# Patient Record
Sex: FEMALE | Race: BLACK OR AFRICAN AMERICAN | Employment: OTHER
[De-identification: names, ages, dates, MRNs, and addresses within clinical notes are randomized per-mention and may not be internally consistent; named-entity substitution may affect disease eponyms.]

---

## 2023-11-27 ENCOUNTER — HOSPITAL ENCOUNTER (OUTPATIENT)
Facility: HOSPITAL | Age: 78
Setting detail: RECURRING SERIES
Discharge: HOME | End: 2023-11-30
Payer: MEDICARE

## 2023-11-27 PROCEDURE — 97161 PT EVAL LOW COMPLEX 20 MIN: CPT

## 2023-11-27 NOTE — PROGRESS NOTES
PT DAILY TREATMENT NOTE/SHOULDER EVAL 10-18      Patient Name: Lei Bradley    Date: 2023    : 1945  Insurance: Payor: Barbie Spivey / Plan: [employer] HMO / Product Type: *No Product type* /      Patient  verified yes     Visit #   Current / Total 1 16   Time   In / Out 1045 am  1125 am    Pain   In / Out 7 7     TREATMENT AREA =  Pain in right shoulder [M25.511]      SUBJECTIVE  Pain Level (0-10 scale): 7/10, burning and aching  [x]constant []intermittent []improving []worsening []no change since onset    Any medication changes, allergies to medications, adverse drug reactions, diagnosis change, or new procedure performed?: [x] No    [] Yes (see summary sheet for update)  Subjective functional status/changes:     PLOF: functionally independent, no AD, active lifestyle, walking for exercise  Limitations to PLOF: upper extremity dressing, putting seat belt on, reaching overhead  Mechanism of Injury: 65 yo female who presents to [facility] with c/o right shoulder pain. Patient states she got an MRI and the results showed a \"torn rotator cuff\" as well as some bicep \"tearing\". Patient reports the shoulder started hurting her in July with insidious onset. Patient has had a couple falls this year, but states that this did not cause the shoulder pain.    Current symptoms/Complaints: 3/10 at best with thermacare, heat, neurontin; 10/10 at worst with using right arm; pt reports they are unable to sleep through the night secondary to pain  Previous Treatment/Compliance: [facility]  PMHx/Surgical Hx: Hx of trigger finger in right hand, osteoporosis, heart disease, HTN (controlled with medication)   Imaging: MRI-torn rotator cuff  Work Hx: retired   Living Situation: living with roommate,   3 years ago  Pt Goals: \"I would like to reduce my pain\"  Barriers: []pain []financial []time []transportation []other  Motivation: good  Substance use: []Alcohol []Tobacco

## 2023-11-27 NOTE — PROGRESS NOTES
[facility] at the [address], [address] [address] [address]   Phone: [phone]     Fax: [phone]       Plan of Care/ Statement of Necessity for Physical Therapy Services    Patient name: Bee Piedra Start of Care: 2023   Referral source: Doc Helton : 1945    Medical Diagnosis: Pain in right shoulder [M25.511]       Onset Date:2023    Treatment Diagnosis: M25.511  RIGHT SHOULDER PAIN                            Prior Hospitalization: see medical history Provider#: [id number]   Medications: Verified on Patient Summary List     Comorbidities: Hx of trigger finger in right hand, osteoporosis, heart disease, HTN (controlled with medication)   Prior Level of Function: functionally independent, no AD, active lifestyle, walking for exercise      The Plan of Care and following information is based on the information from the initial evaluation. Assessment/ key information: 67 yo female who presents to [facility] with c/o right shoulder pain. Patient states she got an MRI and the results showed a \"torn rotator cuff\" as well as some bicep \"tearing\". Patient reports the shoulder started hurting her in July with insidious onset. Patient has had a couple falls this year, but states that this did not cause the shoulder pain. Patient demonstrates decreased ROM, decreased strength, impaired posture, pain and decreased functional mobility tolerance. These impairments causing patient difficulty with UE dressing, reaching overhead and putting seatbelt on.       Patient will continue to benefit from skilled PT services to modify and progress therapeutic interventions, address functional mobility deficits, address ROM deficits, address strength deficits, analyze and address soft tissue restrictions, analyze and cue movement patterns, analyze and modify body mechanics/ergonomics, and assess and modify postural abnormalities to attain

## 2023-12-07 ENCOUNTER — HOSPITAL ENCOUNTER (OUTPATIENT)
Facility: HOSPITAL | Age: 78
Setting detail: RECURRING SERIES
Discharge: HOME | End: 2023-12-10
Payer: MEDICARE

## 2023-12-07 PROCEDURE — 97110 THERAPEUTIC EXERCISES: CPT

## 2023-12-07 PROCEDURE — 97530 THERAPEUTIC ACTIVITIES: CPT

## 2023-12-07 NOTE — PROGRESS NOTES
Procedures: Tx Min Billable or 1:1 Min (if diff from Tx Min) Procedure, Rationale, Specifics   10  72852 Therapeutic Exercise (timed):  increase ROM, strength, coordination, balance, and proprioception to improve patient's ability to progress to PLOF and address remaining functional goals. (see flow sheet as applicable)    Details if applicable:       30  75573 Therapeutic Activity (timed):  use of dynamic activities replicating functional movements to increase ROM, strength, coordination, balance, and proprioception in order to improve patient's ability to progress to PLOF and address remaining functional goals. (see flow sheet as applicable)     Details if applicable:           Details if applicable:            Details if applicable:     36  [facility] Totals Reminder: bill using total billable min of TIMED therapeutic procedures (example: do not include dry needle or estim unattended, both untimed codes, in totals to left)  8-22 min = 1 unit; 23-37 min = 2 units; 38-52 min = 3 units; 53-67 min = 4 units; 68-82 min = 5 units   Total Total     TOTAL TREATMENT TIME:        40     [x]  Patient Education billed concurrently with other procedures   [x] Review HEP    [] Progressed/Changed HEP, detail:    [] Other detail:       Objective Information/Functional Measures/Assessment    Patient tolerated treatment well with minor c/o discomfort with table slides. Educated patient to not push ROM past the pain. Added peg board and flex bar activities. Patient reports soreness in the biceps with flexbar supination and pronation.  Wpuold benefit from continued skilled PT to address deficits below       Patient will continue to benefit from skilled PT / OT services to modify and progress therapeutic interventions, analyze and address functional mobility deficits, analyze and address ROM deficits, analyze and address strength deficits, analyze and address soft tissue restrictions, analyze and cue for proper movement patterns, and

## 2023-12-11 ENCOUNTER — HOSPITAL ENCOUNTER (OUTPATIENT)
Facility: HOSPITAL | Age: 78
Setting detail: RECURRING SERIES
Discharge: HOME | End: 2023-12-14
Payer: MEDICARE

## 2023-12-11 PROCEDURE — 97530 THERAPEUTIC ACTIVITIES: CPT

## 2023-12-11 PROCEDURE — 97140 MANUAL THERAPY 1/> REGIONS: CPT

## 2023-12-11 PROCEDURE — 97112 NEUROMUSCULAR REEDUCATION: CPT

## 2023-12-11 NOTE — PROGRESS NOTES
PHYSICAL / OCCUPATIONAL THERAPY - DAILY TREATMENT NOTE (updated )    Patient Name: Marco Antonio Barcenas    Date: 2023    : 1945  Insurance: Payor: Corrine Nguyen / Plan: [address] [place] [address] HMO / Product Type: *No Product type* /      Patient  verified Yes     Visit #   Current / Total 3 16   Time   In / Out 8:01 8:49   Pain   In / Out 6 0   Subjective Functional Status/Changes: Reports that she doesn't want to have surgery. Reports hasn't done HEP but has an exercise ball and pulley at home. Changes to: Allergies, Med Hx, Sx Hx?   no       TREATMENT AREA =  Pain in right shoulder [M25.511]    OBJECTIVE    Modalities Rationale:     decrease pain and increase tissue extensibility to improve patient's ability to progress to PLOF and address remaining functional goals. min [x] Estim Unattended, type/location:                                      []  w/ice    []  w/heat    min [] Estim Attended, type/location:                                     []  w/US     []  w/ice    []  w/heat    []  TENS insruct      min []  Mechanical Traction: type/lbs                   []  pro   []  sup   []  int   []  cont    []  before manual    []  after manual    min []  Ultrasound, settings/location:      min []  Iontophoresis w/ dexamethasone, location:                                               []  take home patch       []  in clinic     10   min  unbilled []  Ice     [x]  Heat    location/position: MH to right shoulder post therapy sitting in chair    min []  Paraffin,  details:     min []  Vasopneumatic Device, press/temp:     min []  Shelbyt Natan / Napoleon Rodriguez:     If using vaso (only need to measure limb vaso being performed on)      pre-treatment girth :       post-treatment girth :       measured at (landmark location) :      min []  Other:    Skin assessment post-treatment (if applicable):    []  intact    []  redness- no adverse reaction                 []redness - adverse reaction:         Therapeutic

## 2023-12-15 ENCOUNTER — APPOINTMENT (OUTPATIENT)
Facility: HOSPITAL | Age: 78
End: 2023-12-15
Payer: MEDICARE

## 2023-12-21 ENCOUNTER — HOSPITAL ENCOUNTER (OUTPATIENT)
Facility: HOSPITAL | Age: 78
Setting detail: RECURRING SERIES
Discharge: HOME | End: 2023-12-24
Payer: MEDICARE

## 2023-12-21 PROCEDURE — 97140 MANUAL THERAPY 1/> REGIONS: CPT

## 2023-12-21 PROCEDURE — 97110 THERAPEUTIC EXERCISES: CPT

## 2023-12-21 PROCEDURE — 97112 NEUROMUSCULAR REEDUCATION: CPT

## 2023-12-21 NOTE — PROGRESS NOTES
PHYSICAL / OCCUPATIONAL THERAPY - DAILY TREATMENT NOTE (updated )    Patient Name: Jus Singh    Date: 2023    : 1945  Insurance: Payor: Christina Hayes / Plan: [employer] PLUS HMO / Product Type: *No Product type* /      Patient  verified Yes     Visit #   Current / Total 5 16   Time   In / Out 1241 pm 135 pm   Pain   In / Out 6 5   Subjective Functional Status/Changes: Pt reports she cannot find a comfortable way to sleep. Changes to: Allergies, Med Hx, Sx Hx?   no       TREATMENT AREA =  Pain in right shoulder [M25.511]    OBJECTIVE    Modalities Rationale:     decrease pain and increase tissue extensibility to improve patient's ability to progress to PLOF and address remaining functional goals. min [] Estim Unattended, type/location:                                      []  w/ice    []  w/heat    min [] Estim Attended, type/location:                                     []  w/US     []  w/ice    []  w/heat    []  TENS insruct      min []  Mechanical Traction: type/lbs                   []  pro   []  sup   []  int   []  cont    []  before manual    []  after manual    min []  Ultrasound, settings/location:      min []  Iontophoresis w/ dexamethasone, location:                                               []  take home patch       []  in clinic     15   min  unbilled []  Ice     [x]  Heat    location/position: Seated right shoulder     min []  Paraffin,  details:     min []  Vasopneumatic Device, press/temp:     min []  [place] Reason / Valeria : If using vaso (only need to measure limb vaso being performed on)      pre-treatment girth :       post-treatment girth :       measured at (landmark location) :      min []  Other:    Skin assessment post-treatment (if applicable):    []  intact    []  redness- no adverse reaction                 []redness - adverse reaction:         Therapeutic Procedures:   Tx Min Billable or 1:1 Min (if diff from [employer]) Procedure, Rationale, Specifics   11

## 2023-12-26 ENCOUNTER — HOSPITAL ENCOUNTER (OUTPATIENT)
Facility: HOSPITAL | Age: 78
Setting detail: RECURRING SERIES
Discharge: HOME | End: 2023-12-29
Payer: MEDICARE

## 2023-12-26 PROCEDURE — 97530 THERAPEUTIC ACTIVITIES: CPT

## 2023-12-26 PROCEDURE — 97535 SELF CARE MNGMENT TRAINING: CPT

## 2023-12-26 PROCEDURE — 97110 THERAPEUTIC EXERCISES: CPT

## 2023-12-26 NOTE — PROGRESS NOTES
/  [facility] at the [place] [address] [address]  Phone: [phone]      Fax:  [phone]    Progress Note  Patient name: Mitra Garcia Start of Care: 2023   Referral source: Shelby Valdovinos : 1945               Medical Diagnosis: Pain in right shoulder [M25.511]        Onset Date:2023               Treatment Diagnosis: M25.511  RIGHT SHOULDER PAIN                            Prior Hospitalization: see medical history Provider#: [id number]   Medications: Verified on Patient Summary List      Comorbidities: Hx of trigger finger in right hand, osteoporosis, heart disease, HTN (controlled with medication)   Prior Level of Function: functionally independent, no AD, active lifestyle, walking for exercise                            Visits from Start of Care: 6    Missed Visits: 0    Updated Goals/Measure of Progress: To be achieved in 5 weeks:    Short Term Goals: To be accomplished in 3 weeks:  Patient will be independent and compliant with HEP to progress toward goals and restore functional mobility. Eval Status: issued at eval  PN:23 Reports performing 4x/week      Patient will improve pain in right shoulder to 7/10 at worst to improve UE dressing tolerance and restore prior level of function. Eval Status: 10/10 at worst  PN:23 Reports pain in the last 48 hours 4-10/10         Pt will have painfree right shoulder PROM WFL to aid in functional mechanics for ADLs.   Eval Status:               AROM                           PROM              Shoulder Left Right Left Right   Flexion 128 98 WFL all directions 95    74   Unable to assess d/t guarding   ER WFL 10   Unable to assess d/t guarding   IR WFL 60   Unable to assess d/t guarding      PN: 23     some progress/regression     AROM                                 PROM        Shoulder Left Right Left Right   Flexion 128 80 WFL all directions 95

## 2023-12-26 NOTE — PROGRESS NOTES
PHYSICAL / OCCUPATIONAL THERAPY - DAILY TREATMENT NOTE (updated )    Patient Name: Coral Han    Date: 2023    : 1945  Insurance: Payor: Michelle Saleem / Plan: Regina MCKEON HMO / Product Type: *No Product type* /      Patient  verified Yes     Visit #   Current / Total 6 16   Time   In / Out 1200 1250   Pain   In / Out 8 6   Subjective Functional Status/Changes: Reports stinging in the biceps. Increased pain this AM    Changes to: Allergies, Med Hx, Sx Hx?   no       TREATMENT AREA =  Pain in right shoulder [M25.511]    OBJECTIVE    Modalities Rationale:     decrease pain and increase tissue extensibility to improve patient's ability to progress to PLOF and address remaining functional goals. min [] Estim Unattended, type/location:                                                            []  w/ice    []  w/heat     min [] Estim Attended, type/location:                                                           []  w/US     []  w/ice    []  w/heat    []  TENS insruct       min []  Mechanical Traction: type/lbs                    []  pro   []  sup   []  int   []  cont    []  before manual    []  after manual     min []  Ultrasound, settings/location:        min []  Iontophoresis w/ dexamethasone, location:                                                []  take home patch       []  in clinic      10    min  unbilled []  Ice     [x]  Heat    location/position: Seated right shoulder      min []  Paraffin,  details:       min []  Vasopneumatic Device, press/temp:       min []  [place] / [place]: If using vaso (only need to measure limb vaso being performed on)      pre-treatment girth :       post-treatment girth :       measured at (landmark location) :       min []  Other:     Skin assessment post-treatment (if applicable):    []  intact    []  redness- no adverse reaction                 []redness - adverse reaction:         Therapeutic Procedures:   Tx Min Billable

## 2023-12-29 ENCOUNTER — HOSPITAL ENCOUNTER (OUTPATIENT)
Facility: HOSPITAL | Age: 78
Setting detail: RECURRING SERIES
Discharge: HOME | End: 2024-01-01
Payer: MEDICARE

## 2023-12-29 PROCEDURE — 97110 THERAPEUTIC EXERCISES: CPT

## 2023-12-29 PROCEDURE — 97530 THERAPEUTIC ACTIVITIES: CPT

## 2023-12-29 PROCEDURE — 97112 NEUROMUSCULAR REEDUCATION: CPT

## 2024-01-02 ENCOUNTER — HOSPITAL ENCOUNTER (OUTPATIENT)
Facility: HOSPITAL | Age: 79
Setting detail: RECURRING SERIES
Discharge: HOME | End: 2024-01-05
Payer: MEDICARE

## 2024-01-02 PROCEDURE — 97016 VASOPNEUMATIC DEVICE THERAPY: CPT

## 2024-01-02 PROCEDURE — 97110 THERAPEUTIC EXERCISES: CPT

## 2024-01-02 PROCEDURE — 97140 MANUAL THERAPY 1/> REGIONS: CPT

## 2024-01-02 PROCEDURE — 97112 NEUROMUSCULAR REEDUCATION: CPT

## 2024-01-02 NOTE — PROGRESS NOTES
Provider Department Center   1/2/2024 12:00 PM Verónica Briones, PT [facility]   1/4/2024 12:40 PM Kaur Shultz, PT [facility]   1/9/2024 12:00 PM Spencer Bush, PTA [facility]   1/11/2024 12:40 PM Kaur Shultz, PT [facility]   1/16/2024 12:00 PM Spencer Bush, PTA [facility]   1/18/2024 12:40 PM Verónica Briones, PT [facility]

## 2024-01-04 ENCOUNTER — HOSPITAL ENCOUNTER (OUTPATIENT)
Facility: HOSPITAL | Age: 79
Setting detail: RECURRING SERIES
Discharge: HOME | End: 2024-01-07
Payer: MEDICARE

## 2024-01-04 PROCEDURE — 97110 THERAPEUTIC EXERCISES: CPT

## 2024-01-04 PROCEDURE — 97140 MANUAL THERAPY 1/> REGIONS: CPT

## 2024-01-04 NOTE — PROGRESS NOTES
PHYSICAL / OCCUPATIONAL THERAPY - DAILY TREATMENT NOTE (updated )    Patient Name: Emiliana Kaur    Date: 2024    : 1945  Insurance: Payor: [facility] MEDICARE / Plan: DO [facility] MEDICARE / Product Type: *No Product type* /      Patient  verified Yes     Visit #   Current / Total 9 16   Time   In / Out 245 pm 327 pm   Pain   In / Out 5 5   Subjective Functional Status/Changes: Pt reports she is feeling better today.    Changes to:  Allergies, Med Hx, Sx Hx?   no       TREATMENT AREA =  Pain in right shoulder [M25.511]    OBJECTIVE    Modalities Rationale:     decrease pain and increase tissue extensibility to improve patient's ability to progress to PLOF and address remaining functional goals.     min [] Estim Unattended, type/location:                                      []  w/ice    []  w/heat    min [] Estim Attended, type/location:                                     []  w/US     []  w/ice    []  w/heat    []  TENS insruct      min []  Mechanical Traction: type/lbs                   []  pro   []  sup   []  int   []  cont    []  before manual    []  after manual    min []  Ultrasound, settings/location:      min []  Iontophoresis w/ dexamethasone, location:                                               []  take home patch       []  in clinic     5 prior   min  unbilled []  Ice     [x]  Heat    location/position: Seated right shoulder     min []  Paraffin,  details:    3 min [x]  Vasopneumatic Device, press/temp: 34 deg, mod    min []  [place]irl[employer] / Fluido:    If using vaso (only need to measure limb vaso being performed on)      pre-treatment girth : 41.9 cm      post-treatment girth :  not assessed, patient had to leave due to being late for another appointment       measured at (landmark location) :  mid axilla     min []  Other:    Skin assessment post-treatment (if applicable):    []  intact    []  redness- no adverse reaction                 []redness - adverse reaction:         Therapeutic

## 2024-01-09 ENCOUNTER — APPOINTMENT (OUTPATIENT)
Facility: HOSPITAL | Age: 79
End: 2024-01-09
Payer: MEDICARE

## 2024-01-10 NOTE — PROGRESS NOTES
PHYSICAL / OCCUPATIONAL THERAPY - DAILY TREATMENT NOTE     Patient Name: Emiliana Kaur    Date: 1/10/2024    : 1945  Insurance: Payor: [facility] MEDICARE / Plan: DO [facility] MEDICARE / Product Type: *No Product type* /      Patient  verified Yes     Visit #   Current / Total 10 16   Time   In / Out 1241 pm  139 pm   Pain   In / Out 5 4   Subjective Functional Status/Changes: Pt states she thinks she sleeps on the shoulder \"wrong\" some nights and wakes up in more pain some mornings.   Changes to:  Allergies, Med Hx, Sx Hx?   no       TREATMENT AREA =  Pain in right shoulder [M25.511]    OBJECTIVE    Modalities Rationale:     decrease pain and increase tissue extensibility to improve patient's ability to progress to PLOF and address remaining functional goals.     min [] Estim Unattended, type/location:                                      []  w/ice    []  w/heat    min [] Estim Attended, type/location:                                     []  w/US     []  w/ice    []  w/heat    []  TENS insruct      min []  Mechanical Traction: type/lbs                   []  pro   []  sup   []  int   []  cont    []  before manual    []  after manual    min []  Ultrasound, settings/location:      min []  Iontophoresis w/ dexamethasone, location:                                               []  take home patch       []  in clinic     5 min pre   min  unbilled []  Ice     [x]  Heat    location/position: Seated, right shoulder     min []  Paraffin,  details:    10 min [x]  Vasopneumatic Device, press/temp: 34 deg, med     min []  Whirlpool / Fluido:    If using vaso (only need to measure limb vaso being performed on)      pre-treatment girth : 40.5 cm       post-treatment girth :       measured at (landmark location) :  mid axilla     min []  Other:    Skin assessment post-treatment (if applicable):    []  intact    []  redness- no adverse reaction                 []redness - adverse reaction:         Therapeutic Procedures:  Tx

## 2024-01-11 ENCOUNTER — HOSPITAL ENCOUNTER (OUTPATIENT)
Facility: HOSPITAL | Age: 79
Setting detail: RECURRING SERIES
Discharge: HOME | End: 2024-01-14
Payer: MEDICARE

## 2024-01-11 PROCEDURE — 97016 VASOPNEUMATIC DEVICE THERAPY: CPT

## 2024-01-11 PROCEDURE — 97112 NEUROMUSCULAR REEDUCATION: CPT

## 2024-01-11 PROCEDURE — 97110 THERAPEUTIC EXERCISES: CPT

## 2024-01-11 PROCEDURE — 97140 MANUAL THERAPY 1/> REGIONS: CPT

## 2024-01-16 ENCOUNTER — HOSPITAL ENCOUNTER (OUTPATIENT)
Facility: HOSPITAL | Age: 79
Setting detail: RECURRING SERIES
Discharge: HOME | End: 2024-01-19
Payer: MEDICARE

## 2024-01-16 PROCEDURE — 97140 MANUAL THERAPY 1/> REGIONS: CPT

## 2024-01-16 PROCEDURE — 97016 VASOPNEUMATIC DEVICE THERAPY: CPT

## 2024-01-16 PROCEDURE — 97112 NEUROMUSCULAR REEDUCATION: CPT

## 2024-01-16 PROCEDURE — 97110 THERAPEUTIC EXERCISES: CPT

## 2024-01-16 NOTE — PROGRESS NOTES
PHYSICAL / OCCUPATIONAL THERAPY - DAILY TREATMENT NOTE     Patient Name: Emiliana Kaur    Date: 2024    : 1945  Insurance: Payor: [employer] MEDICARE / Plan: DO [facility] MEDICARE / Product Type: *No Product type* /      Patient  verified Yes     Visit #   Current / Total 11 16   Time   In / Out 11:45am 12:40pm   Pain   In / Out 7-8 7   Subjective Functional Status/Changes: Pt reports that her bicep is hurting badly today. Reports that she uses medicine and heat to manage pain at home.    Changes to:  Allergies, Med Hx, Sx Hx?   no       TREATMENT AREA =  Pain in right shoulder [M25.511]    OBJECTIVE    Modalities Rationale:     decrease pain and increase tissue extensibility to improve patient's ability to progress to PLOF and address remaining functional goals.     min [] Estim Unattended, type/location:                                      []  w/ice    []  w/heat    min [] Estim Attended, type/location:                                     []  w/US     []  w/ice    []  w/heat    []  TENS insruct      min []  Mechanical Traction: type/lbs                   []  pro   []  sup   []  int   []  cont    []  before manual    []  after manual    min []  Ultrasound, settings/location:      min []  Iontophoresis w/ dexamethasone, location:                                               []  take home patch       []  in clinic     5 min pre   min  unbilled []  Ice     [x]  Heat    location/position: Seated, right shoulder   (Performed concurrently during subjective intake + patient education)    min []  Paraffin,  details:    10 min [x]  Vasopneumatic Device, press/temp: 34 deg, med     min []  Whirlpool / Fluido:    If using vaso (only need to measure limb vaso being performed on)      pre-treatment girth : 40.5 cm       post-treatment girth :       measured at (landmark location) :  mid axilla     min []  Other:    Skin assessment post-treatment (if applicable):    []  intact    []  redness- no adverse reaction

## 2024-01-18 ENCOUNTER — HOSPITAL ENCOUNTER (OUTPATIENT)
Facility: HOSPITAL | Age: 79
Setting detail: RECURRING SERIES
Discharge: HOME | End: 2024-01-21
Payer: MEDICARE

## 2024-01-18 PROCEDURE — 97530 THERAPEUTIC ACTIVITIES: CPT

## 2024-01-18 PROCEDURE — 97112 NEUROMUSCULAR REEDUCATION: CPT

## 2024-01-18 PROCEDURE — 97110 THERAPEUTIC EXERCISES: CPT

## 2024-01-18 NOTE — PROGRESS NOTES
PHYSICAL / OCCUPATIONAL THERAPY - DAILY TREATMENT NOTE     Patient Name: Emiliana Kaur    Date: 2024    : 1945  Insurance: Payor: [employer] MEDICARE / Plan: DO [facility] MEDICARE / Product Type: *No Product type* /      Patient  verified Yes     Visit #   Current / Total 12 16   Time   In / Out 12:40pm 1:25pm   Pain   In / Out 6 5-6   Subjective Functional Status/Changes: The pt reports feeling fair    Changes to:  Allergies, Med Hx, Sx Hx?   no       TREATMENT AREA =  Pain in right shoulder [M25.511]    OBJECTIVE    Modalities Rationale:     decrease pain to improve patient's ability to progress to PLOF and address remaining functional goals.     min [] Estim Unattended, type/location:                                      []  w/ice    []  w/heat    min [] Estim Attended, type/location:                                     []  w/US     []  w/ice    []  w/heat    []  TENS insruct      min []  Mechanical Traction: type/lbs                   []  pro   []  sup   []  int   []  cont    []  before manual    []  after manual    min []  Ultrasound, settings/location:      min []  Iontophoresis w/ dexamethasone, location:                                               []  take home patch       []  in clinic     5   min  unbilled []  Ice     [x]  Heat    location/position: Seated - prior to exercise interventions - right shoulder    min []  Paraffin,  details:     min []  Vasopneumatic Device, press/temp:     min []  Whirlpool / Fluido:    If using vaso (only need to measure limb vaso being performed on)      pre-treatment girth :       post-treatment girth :       measured at (landmark location) :      min []  Other:    Skin assessment post-treatment (if applicable):    []  intact    []  redness- no adverse reaction                 []redness - adverse reaction:         Therapeutic Procedures:  Tx Min Billable or 1:1 Min (if diff from Tx Min) Procedure, Rationale, Specifics   18  05383 Therapeutic Exercise (timed):  
  Flexion 128 98 WFL all directions 95    74   Unable to assess d/t guarding   ER WFL 10   Unable to assess d/t guarding   IR WFL 60   Unable to assess d/t guarding      Last PN 12/26/23:     some progress/regression     AROM                                 PROM        Shoulder Left Right Left Right   Flexion 128 80 WFL all directions 95    85   109   ER WFL P 52   52   IR WFL P 52   52      Current 1/18/24: progressing                 AROM                                 PROM              Shoulder Left Right Left Right   Flexion NT 95    ABD NT 74    ER @90 deg ABD NT 90  NT 90   IR @90 deg ABD NT NT NT 43   *frequent VC for the pt to perform deep breathing to decrease muscle guarding for PROM      Pt will have 4/5 right shoulder strength to return to goals of reaching overhead without difficulty.  Eval Status:   Shoulder Left (1-5) Right (1-5)   Shoulder Flexion 4- 3   Shoulder ABD 4- 3-   Shoulder IR 4 4-   Shoulder ER 4 3+   Elbow flexion 4+ 4-   Elbow extension 4 4-   Mid trap  NT NT   Lower trap NT NT      Last PN 12/26/23:progressing   Shoulder Left (1-5) Right (1-5)   Shoulder Flexion 4- 3   Shoulder ABD 4- 3-   Shoulder IR 4- 4-   Shoulder ER 4- 4-   Elbow flexion 4 4-   Elbow extension 4 4-   Mid trap  NT NT   Lower trap NT NT      Current 1/18/24: change in status may be due to a different therapist      Shoulder Left (1-5) Right (1-5)   Shoulder Flexion 4- 2+   Shoulder ABD 4 2+   Shoulder IR 4 3+   Shoulder ER 4- 3+   Elbow flexion 4+ 4   Elbow extension 4 4+   Mid trap  NT NT   Lower trap NT NT            Patient will improve pain in right shoulder to 4/10 at worst to improve overhead reaching tolerance and restore prior level of function.  Eval Status: 10/10 at worst  Last PN 12/26/23: Reports pain in the last 48 hours 4-10/10   Current 1/18/24: see goal above     Frequency / Duration: Patient to be seen 2 times per week for 8weeks:    Assessment / Recommendations:77 yo

## 2024-01-24 ENCOUNTER — HOSPITAL ENCOUNTER (OUTPATIENT)
Facility: HOSPITAL | Age: 79
Setting detail: RECURRING SERIES
Discharge: HOME | End: 2024-01-27
Payer: MEDICARE

## 2024-01-24 PROCEDURE — 97110 THERAPEUTIC EXERCISES: CPT

## 2024-01-24 PROCEDURE — 97530 THERAPEUTIC ACTIVITIES: CPT

## 2024-01-24 PROCEDURE — 97112 NEUROMUSCULAR REEDUCATION: CPT

## 2024-01-24 PROCEDURE — 97016 VASOPNEUMATIC DEVICE THERAPY: CPT

## 2024-01-24 NOTE — PROGRESS NOTES
tolerated  []  Discharge due to :  []  Other:    Spencer Bush PTA    1/24/2024    7:58 AM    Future Appointments   Date Time Provider Department Center   1/24/2024 12:40 PM Spencer Bush, PTA [facility]   1/26/2024  9:30 AM Addie Hylton, PT [facility]   1/31/2024 12:40 PM Spencer Bush, PTA [facility]   2/2/2024 11:30 AM Addie Hylton, PT [facility]   2/7/2024 11:20 AM Verónica Briones, PT [facility]   2/9/2024 10:40 AM Verónica Briones, PT [facility]   2/14/2024 12:40 PM Verónica Briones, PT [facility]   2/16/2024 11:20 AM Verónica Briones, PT [facility]   2/21/2024 12:40 PM Spencer Bush, PTA [facility]   2/23/2024 11:20 AM Verónica Briones, PT [facility]   2/28/2024 11:20 AM Verónica Briones, PT [facility]   3/1/2024 11:20 AM Verónica Briones, PT [facility]   3/6/2024 12:00 PM Verónica Briones, PT [facility]   3/8/2024 11:20 AM Verónica Briones, PT [facility]

## 2024-01-26 ENCOUNTER — HOSPITAL ENCOUNTER (OUTPATIENT)
Facility: HOSPITAL | Age: 79
Setting detail: RECURRING SERIES
Discharge: HOME | End: 2024-01-29
Payer: MEDICARE

## 2024-01-26 PROCEDURE — 97110 THERAPEUTIC EXERCISES: CPT

## 2024-01-26 PROCEDURE — 97530 THERAPEUTIC ACTIVITIES: CPT

## 2024-01-26 PROCEDURE — 97016 VASOPNEUMATIC DEVICE THERAPY: CPT

## 2024-01-26 PROCEDURE — 97112 NEUROMUSCULAR REEDUCATION: CPT

## 2024-01-26 NOTE — PROGRESS NOTES
PHYSICAL / OCCUPATIONAL THERAPY - DAILY TREATMENT NOTE     Patient Name: Emiliana Kaur    Date: 2024    : 1945  Insurance: Payor: [facility] MEDICARE / Plan: DO [facility] MEDICARE / Product Type: *No Product type* /      Patient  verified Yes     Visit #   Current / Total 14 28   Time   In / Out 920 1015   Pain   In / Out 4 4   Subjective Functional Status/Changes: Reports decreased pain in the shoulder today compared to last visit    Changes to:  Allergies, Med Hx, Sx Hx?   no       TREATMENT AREA =  Pain in right shoulder [M25.511]    OBJECTIVE    Modalities Rationale:     decrease pain and increase tissue extensibility to improve patient's ability to progress to PLOF and address remaining functional goals.                             min [] Estim Unattended, type/location:                                                            []  w/ice    []  w/heat     min [] Estim Attended, type/location:                                                           []  w/US     []  w/ice    []  w/heat    []  TENS insruct       min []  Mechanical Traction: type/lbs                    []  pro   []  sup   []  int   []  cont    []  before manual    []  after manual     min []  Ultrasound, settings/location:        min []  Iontophoresis w/ dexamethasone, location:                                                []  take home patch       []  in clinic      5 min pre    min  unbilled []  Ice     [x]  Heat    location/position: Seated, right shoulder   (Performed concurrently during subjective intake + patient education)     min []  Paraffin,  details:     10 min [x]  Vasopneumatic Device, press/temp: 34 deg, med      min []  Whirlpool / Fluido:     If using vaso (only need to measure limb vaso being performed on)      pre-treatment girth : 40.5 cm       post-treatment girth : 40.5      measured at (landmark location) :  mid axilla      min []  Other:     Skin assessment post-treatment (if applicable):    []  intact    []

## 2024-01-31 ENCOUNTER — HOSPITAL ENCOUNTER (OUTPATIENT)
Facility: HOSPITAL | Age: 79
Setting detail: RECURRING SERIES
Discharge: HOME | End: 2024-02-03
Payer: MEDICARE

## 2024-01-31 PROCEDURE — 97112 NEUROMUSCULAR REEDUCATION: CPT

## 2024-01-31 PROCEDURE — 97110 THERAPEUTIC EXERCISES: CPT

## 2024-01-31 PROCEDURE — 97016 VASOPNEUMATIC DEVICE THERAPY: CPT

## 2024-01-31 PROCEDURE — 97530 THERAPEUTIC ACTIVITIES: CPT

## 2024-01-31 NOTE — PROGRESS NOTES
PHYSICAL / OCCUPATIONAL THERAPY - DAILY TREATMENT NOTE     Patient Name: Emiliana Kaur    Date: 2024    : 1945  Insurance: Payor: [employer] MEDICARE / Plan: DO [facility] MEDICARE / Product Type: *No Product type* /      Patient  verified Yes     Visit #   Current / Total 15 28   Time   In / Out 9:30am 10:25am   Pain   In / Out 5 4   Subjective Functional Status/Changes: Pt reports her shoulder is hurting more today. She attributes it to either the bad weather or doing too much laundry    Changes to:  Allergies, Med Hx, Sx Hx?   no       TREATMENT AREA =  Pain in right shoulder [M25.511]    OBJECTIVE    Modalities Rationale:     decrease pain and increase tissue extensibility to improve patient's ability to progress to PLOF and address remaining functional goals.                             min [] Estim Unattended, type/location:                                                            []  w/ice    []  w/heat     min [] Estim Attended, type/location:                                                           []  w/US     []  w/ice    []  w/heat    []  TENS insruct       min []  Mechanical Traction: type/lbs                    []  pro   []  sup   []  int   []  cont    []  before manual    []  after manual     min []  Ultrasound, settings/location:        min []  Iontophoresis w/ dexamethasone, location:                                                []  take home patch       []  in clinic      5 min pre    min  unbilled []  Ice     [x]  Heat    location/position: Seated, right shoulder   (Performed concurrently during subjective intake + patient education)     min []  Paraffin,  details:     10 min [x]  Vasopneumatic Device, press/temp: 34 deg, med      min []  Whirlpool / Fluido:     If using vaso (only need to measure limb vaso being performed on)      pre-treatment girth : 40.5 cm       post-treatment girth : 40.5      measured at (landmark location) :  mid axilla      min []  Other:     Skin

## 2024-02-02 ENCOUNTER — HOSPITAL ENCOUNTER (OUTPATIENT)
Facility: HOSPITAL | Age: 79
Setting detail: RECURRING SERIES
Discharge: HOME | End: 2024-02-05
Payer: MEDICARE

## 2024-02-02 PROCEDURE — 97140 MANUAL THERAPY 1/> REGIONS: CPT

## 2024-02-02 PROCEDURE — 97112 NEUROMUSCULAR REEDUCATION: CPT

## 2024-02-02 PROCEDURE — 97016 VASOPNEUMATIC DEVICE THERAPY: CPT

## 2024-02-02 PROCEDURE — 97110 THERAPEUTIC EXERCISES: CPT

## 2024-02-02 NOTE — PROGRESS NOTES
PHYSICAL / OCCUPATIONAL THERAPY - DAILY TREATMENT NOTE     Patient Name: Emiliana Kaur    Date: 2024    : 1945  Insurance: Payor: [employer] MEDICARE / Plan: DO [facility] MEDICARE / Product Type: *No Product type* /      Patient  verified Yes     Visit #   Current / Total 16 28   Time   In / Out 11:32am 12:30PM   Pain   In / Out 5-6 0   Subjective Functional Status/Changes: Pt reports that her bicep is feeling sore and like its going to spasm. She is unsure why.   Reports dec pain with turning nightstand lamp on/off using ergonomics discussed last session   Changes to:  Allergies, Med Hx, Sx Hx?   no       TREATMENT AREA =  Pain in right shoulder [M25.511]    OBJECTIVE    Modalities Rationale:     decrease pain and increase tissue extensibility to improve patient's ability to progress to PLOF and address remaining functional goals.                             min [] Estim Unattended, type/location:                                                            []  w/ice    []  w/heat     min [] Estim Attended, type/location:                                                           []  w/US     []  w/ice    []  w/heat    []  TENS insruct       min []  Mechanical Traction: type/lbs                    []  pro   []  sup   []  int   []  cont    []  before manual    []  after manual     min []  Ultrasound, settings/location:        min []  Iontophoresis w/ dexamethasone, location:                                                []  take home patch       []  in clinic      5 min pre    min  unbilled []  Ice     [x]  Heat    location/position: Seated, right shoulder   (Performed concurrently during subjective intake + patient education)     min []  Paraffin,  details:     10 min [x]  Vasopneumatic Device, press/temp: 34 deg, med      min []  Whirlpool / Fluido:     If using vaso (only need to measure limb vaso being performed on)      pre-treatment girth : 40.5 cm       post-treatment girth : 40.5      measured at

## 2024-02-07 ENCOUNTER — HOSPITAL ENCOUNTER (OUTPATIENT)
Facility: HOSPITAL | Age: 79
Setting detail: RECURRING SERIES
Discharge: HOME | End: 2024-02-10
Payer: MEDICARE

## 2024-02-07 PROCEDURE — 97530 THERAPEUTIC ACTIVITIES: CPT

## 2024-02-07 PROCEDURE — 97110 THERAPEUTIC EXERCISES: CPT

## 2024-02-07 PROCEDURE — 97016 VASOPNEUMATIC DEVICE THERAPY: CPT

## 2024-02-07 NOTE — PROGRESS NOTES
extension 4 4-   Mid trap  NT NT   Lower trap NT NT      Last PN 1/18/24: change in status may be due to a different therapist      Shoulder Left (1-5) Right (1-5)   Shoulder Flexion 4- 2+   Shoulder ABD 4 2+   Shoulder IR 4 3+   Shoulder ER 4- 3+   Elbow flexion 4+ 4   Elbow extension 4 4+   Mid trap  NT NT   Lower trap NT NT            Patient will improve pain in right shoulder to 4/10 at worst to improve overhead reaching tolerance and restore prior level of function.  Eval Status: 10/10 at worst  Last PN 1/18/24: see goal above   Current 2/7/2024: Progressing - Reports 4/10 pain entering today        PLAN  Yes  Continue plan of care  []  Upgrade activities as tolerated  []  Discharge due to :  []  Other:    Spencer Bush PTA    2/7/2024    7:42 AM    Future Appointments   Date Time Provider Department Center   2/7/2024 11:20 AM Spencer Bush PTA [facility]   2/9/2024 10:40 AM Verónica Briones, PT [facility]   2/14/2024 12:40 PM Verónica Briones, PT [facility]   2/16/2024 11:20 AM Verónica Briones, PT [facility]   2/21/2024 12:40 PM Spencer Bush, PTA [facility]   2/23/2024 11:20 AM Verónica Briones, PT [facility]   2/28/2024 11:20 AM Verónica Briones, PT [facility]   3/1/2024 11:20 AM Zion Brionesa, PT [facility]   3/6/2024 12:00 PM Zion Brionesa, PT [facility]   3/8/2024 11:20 AM Verónica Briones, PT [facility]

## 2024-02-09 ENCOUNTER — HOSPITAL ENCOUNTER (OUTPATIENT)
Facility: HOSPITAL | Age: 79
Setting detail: RECURRING SERIES
Discharge: HOME | End: 2024-02-12
Payer: MEDICARE

## 2024-02-09 PROCEDURE — 97140 MANUAL THERAPY 1/> REGIONS: CPT

## 2024-02-09 PROCEDURE — 97110 THERAPEUTIC EXERCISES: CPT

## 2024-02-09 PROCEDURE — 97112 NEUROMUSCULAR REEDUCATION: CPT

## 2024-02-09 NOTE — PROGRESS NOTES
PHYSICAL / OCCUPATIONAL THERAPY - DAILY TREATMENT NOTE     Patient Name: Emiliana Kaur    Date: 2024    : 1945  Insurance: Payor: [employer] MEDIC[employer] / Plan: DO [facility] MEDICARE / Product Type: *No Product type* /      Patient  verified Yes     Visit #   Current / Total 18 28   Time   In / Out 10:40am 11:30pm   Pain   In / Out 4 0   Subjective Functional Status/Changes: F/u with MD  last week = noted bladder infection (MD provided medication) and pt told MD about back pain who provided her with medication. Minimal conversation about right shoulder - continue OPPT due to some improvement of pain/activity tolerance for ADLs    \"I do not want surgery\"   Changes to:  Allergies, Med Hx, Sx Hx?   yes - antibiotics for bladder infection and muscle relaxer        TREATMENT AREA =  Pain in right shoulder [M25.511]    OBJECTIVE    Modalities Rationale:     decrease pain to improve patient's ability to progress to PLOF and address remaining functional goals.                   min [] Estim Unattended, type/location:                                                            []  w/ice    []  w/heat     min [] Estim Attended, type/location:                                                           []  w/US     []  w/ice    []  w/heat    []  TENS insruct       min []  Mechanical Traction: type/lbs                    []  pro   []  sup   []  int   []  cont    []  before manual    []  after manual     min []  Ultrasound, settings/location:        min []  Iontophoresis w/ dexamethasone, location:                                                []  take home patch       []  in clinic      5     min  unbilled []  Ice     [x]  Heat    location/position:  prior to treatment session - right shoulder      min []  Paraffin,  details:       min []  Vasopneumatic Device, press/temp:       min []  Whirlpool / Fluido:     If using vaso (only need to measure limb vaso being performed on)      pre-treatment girth :       post-treatment

## 2024-02-14 ENCOUNTER — HOSPITAL ENCOUNTER (OUTPATIENT)
Facility: HOSPITAL | Age: 79
Setting detail: RECURRING SERIES
Discharge: HOME | End: 2024-02-17
Payer: MEDICARE

## 2024-02-14 PROCEDURE — 97110 THERAPEUTIC EXERCISES: CPT

## 2024-02-14 PROCEDURE — 97016 VASOPNEUMATIC DEVICE THERAPY: CPT

## 2024-02-14 PROCEDURE — 97530 THERAPEUTIC ACTIVITIES: CPT

## 2024-02-14 NOTE — PROGRESS NOTES
PHYSICAL / OCCUPATIONAL THERAPY - DAILY TREATMENT NOTE     Patient Name: Emiliana Kaur    Date: 2024    : 1945  Insurance: Payor: [employer] MEDICARE / Plan: DO [facility] MEDICARE / Product Type: *No Product type* /      Patient  verified Yes     Visit #   Current / Total 19 28   Time   In / Out 1240 134   Pain   In / Out 5 4   Subjective Functional Status/Changes: Patient reports she had increased pain after performing QP min reach   Changes to:  Allergies, Med Hx, Sx Hx?   no       TREATMENT AREA =  Pain in right shoulder [M25.511]    OBJECTIVE      Modalities Rationale:     decrease pain to improve patient's ability to progress to PLOF and address remaining functional goals.                             min [] Estim Unattended, type/location:                                                            []  w/ice    []  w/heat     min [] Estim Attended, type/location:                                                           []  w/US     []  w/ice    []  w/heat    []  TENS insruct       min []  Mechanical Traction: type/lbs                    []  pro   []  sup   []  int   []  cont    []  before manual    []  after manual     min []  Ultrasound, settings/location:        min []  Iontophoresis w/ dexamethasone, location:                                                []  take home patch       []  in clinic      5     min  unbilled []  Ice     [x]  Heat    location/position:  prior to treatment session - right shoulder      min []  Paraffin,  details:     10  min []  Vasopneumatic Device, press/temp:  34 deg, med      min []  Whirlpool / Fluido:     If using vaso (only need to measure limb vaso being performed on)      pre-treatment girth : 40 cm       post-treatment girth : 40 cm       measured at (landmark location) :  mid axilla      min []  Other:     Skin assessment post-treatment (if applicable):    []  intact    []  redness- no adverse reaction                 []redness - adverse reaction:

## 2024-02-16 ENCOUNTER — HOSPITAL ENCOUNTER (OUTPATIENT)
Facility: HOSPITAL | Age: 79
Setting detail: RECURRING SERIES
Discharge: HOME | End: 2024-02-19
Payer: MEDICARE

## 2024-02-16 PROCEDURE — 97110 THERAPEUTIC EXERCISES: CPT

## 2024-02-16 PROCEDURE — 97530 THERAPEUTIC ACTIVITIES: CPT

## 2024-02-16 PROCEDURE — 97140 MANUAL THERAPY 1/> REGIONS: CPT

## 2024-02-16 NOTE — PROGRESS NOTES
address soft tissue restrictions, analyze and cue for proper movement patterns, analyze and modify for postural abnormalities, and analyze and address imbalance/dizziness to address functional deficits and attain remaining goals.      ASSESSMENT/RECOMMENDATIONS:  Continue per plan of care.     Thank you for this referral.   Verónica Briones, PT 2/16/2024 4:15 PM  
goals of reaching overhead without difficulty.  Eval Status:   Shoulder Left (1-5) Right (1-5)   Shoulder Flexion 4- 3   Shoulder ABD 4- 3-   Shoulder IR 4 4-   Shoulder ER 4 3+   Elbow flexion 4+ 4-   Elbow extension 4 4-   Mid trap  NT NT   Lower trap NT NT      Last PN 1/18/24: change in status may be due to a different therapist      Shoulder Left (1-5) Right (1-5)   Shoulder Flexion 4- 2+   Shoulder ABD 4 2+   Shoulder IR 4 3+   Shoulder ER 4- 3+   Elbow flexion 4+ 4   Elbow extension 4 4+   Mid trap  NT NT   Lower trap NT NT      Current 2/16/24: change in status   Shoulder Left (1-5) Right (1-5)   Shoulder Flexion 3+ 2+   Shoulder ABD 4- 2+   Shoulder IR 4- 4-   Shoulder ER 4 4-   Elbow flexion 4+ 4   Elbow extension 4+ 4+   Mid trap  NT NT   Lower trap NT NT           Patient will improve pain in right shoulder to 4/10 at worst to improve overhead reaching tolerance and restore prior level of function.  Eval Status: 10/10 at worst  Last PN 1/18/24: see goal above   Current 2/7/2024: Progressing - Reports 4/10 pain entering today   Current 2/16/24: see STG above       PLAN  Yes  Continue plan of care  []  Upgrade activities as tolerated  []  Discharge due to :  []  Other:    Verónica Briones, DAMION    2/16/2024    12:47 PM    Future Appointments   Date Time Provider Department Center   2/16/2024  2:00 PM Verónica Briones, PT [facility]   2/21/2024 12:40 PM Spencer Bush, PTA [facility]   2/23/2024 11:20 AM Verónica Briones, PT [facility]   2/28/2024 11:20 AM Juan Lynne, PT [facility]   3/1/2024 11:20 AM Verónica Briones, PT [facility]   3/6/2024 12:00 PM Verónica Briones, PT [facility]   3/8/2024 11:20 AM Verónica Briones, PT [facility]

## 2024-02-21 ENCOUNTER — HOSPITAL ENCOUNTER (OUTPATIENT)
Facility: HOSPITAL | Age: 79
Setting detail: RECURRING SERIES
Discharge: HOME | End: 2024-02-24
Payer: MEDICARE

## 2024-02-21 PROCEDURE — 97112 NEUROMUSCULAR REEDUCATION: CPT

## 2024-02-21 PROCEDURE — 97110 THERAPEUTIC EXERCISES: CPT

## 2024-02-21 PROCEDURE — 97530 THERAPEUTIC ACTIVITIES: CPT

## 2024-02-21 PROCEDURE — 97016 VASOPNEUMATIC DEVICE THERAPY: CPT

## 2024-02-21 NOTE — PROGRESS NOTES
PHYSICAL / OCCUPATIONAL THERAPY - DAILY TREATMENT NOTE     Patient Name: Emiliana Kaur    Date: 2024    : 1945  Insurance: Payor: [employer] MEDICARE / Plan: DO [facility] MEDICARE / Product Type: *No Product type* /      Patient  verified Yes     Visit #   Current / Total 21 28   Time   In / Out 1240 130   Pain   In / Out 4 0   Subjective Functional Status/Changes: Reports she tripped going up the stairs this morning \"probably from those slippers\" but does not report falling to the floor. No increased pain from tripping    Changes to:  Allergies, Med Hx, Sx Hx?   no       TREATMENT AREA =  Pain in right shoulder [M25.511]    OBJECTIVE      Modalities Rationale:     decrease pain to improve patient's ability to progress to PLOF and address remaining functional goals.                             min [] Estim Unattended, type/location:                                                            []  w/ice    []  w/heat     min [] Estim Attended, type/location:                                                           []  w/US     []  w/ice    []  w/heat    []  TENS insruct       min []  Mechanical Traction: type/lbs                    []  pro   []  sup   []  int   []  cont    []  before manual    []  after manual     min []  Ultrasound, settings/location:        min []  Iontophoresis w/ dexamethasone, location:                                                []  take home patch       []  in clinic      5     min  unbilled []  Ice     [x]  Heat    location/position:  prior to treatment session - right shoulder      min []  Paraffin,  details:     10  min []  Vasopneumatic Device, press/temp:  34 deg, med      min []  Whirlpool / Fluido:     If using vaso (only need to measure limb vaso being performed on)      pre-treatment girth : 40 cm       post-treatment girth : 40 cm       measured at (landmark location) :  mid axilla      min []  Other:     Skin assessment post-treatment (if applicable):    []  intact

## 2024-02-23 ENCOUNTER — HOSPITAL ENCOUNTER (OUTPATIENT)
Facility: HOSPITAL | Age: 79
Setting detail: RECURRING SERIES
Discharge: HOME | End: 2024-02-26
Payer: MEDICARE

## 2024-02-23 PROCEDURE — 97530 THERAPEUTIC ACTIVITIES: CPT

## 2024-02-23 PROCEDURE — 97110 THERAPEUTIC EXERCISES: CPT

## 2024-02-23 PROCEDURE — 97112 NEUROMUSCULAR REEDUCATION: CPT

## 2024-02-23 NOTE — PROGRESS NOTES
PHYSICAL / OCCUPATIONAL THERAPY - DAILY TREATMENT NOTE     Patient Name: Emiliana Kaur    Date: 2024    : 1945  Insurance: Payor: [employer]IC[employer] / Plan: DO [facility] MEDICARE / Product Type: *No Product type* /      Patient  verified Yes     Visit #   Current / Total 22 28   Time   In / Out 800 845   Pain   In / Out 0 0   Subjective Functional Status/Changes: Reports no pain coming in today    Changes to:  Allergies, Med Hx, Sx Hx?   no       TREATMENT AREA =  Pain in right shoulder [M25.511]    OBJECTIVE      Therapeutic Procedures:  Tx Min Billable or 1:1 Min (if diff from Tx Min) Procedure, Rationale, Specifics   20  05663 Therapeutic Exercise (timed):  increase ROM, strength, coordination, balance, and proprioception to improve patient's ability to progress to PLOF and address remaining functional goals. (see flow sheet as applicable)    Details if applicable:       8  26906 Neuromuscular Re-Education (timed):  improve balance, coordination, kinesthetic sense, posture, core stability and proprioception to improve patient's ability to develop conscious control of individual muscles and awareness of position of extremities in order to progress to PLOF and address remaining functional goals. (see flow sheet as applicable)    Details if applicable:     12  60204 Therapeutic Activity (timed):  use of dynamic activities replicating functional movements to increase ROM, strength, coordination, balance, and proprioception in order to improve patient's ability to progress to PLOF and address remaining functional goals.  (see flow sheet as applicable)     Details if applicable:     40  [facility] BC Totals Reminder: bill using total billable min of TIMED therapeutic procedures (example: do not include dry needle or estim unattended, both untimed codes, in totals to left)  8-22 min = 1 unit; 23-37 min = 2 units; 38-52 min = 3 units; 53-67 min = 4 units; 68-82 min = 5 units   Total Total     TOTAL TREATMENT TIME:

## 2024-02-26 ENCOUNTER — HOSPITAL ENCOUNTER (OUTPATIENT)
Facility: HOSPITAL | Age: 79
Setting detail: RECURRING SERIES
Discharge: HOME | End: 2024-02-29
Payer: MEDICARE

## 2024-02-26 PROCEDURE — 97112 NEUROMUSCULAR REEDUCATION: CPT

## 2024-02-26 PROCEDURE — 97110 THERAPEUTIC EXERCISES: CPT

## 2024-02-26 PROCEDURE — 97530 THERAPEUTIC ACTIVITIES: CPT

## 2024-02-26 PROCEDURE — 97016 VASOPNEUMATIC DEVICE THERAPY: CPT

## 2024-02-26 NOTE — PROGRESS NOTES
PHYSICAL / OCCUPATIONAL THERAPY - DAILY TREATMENT NOTE     Patient Name: Emiliana Kaur    Date: 2024    : 1945  Insurance: Payor: [employer] / Plan: DO [facility] MEDICARE / Product Type: *No Product type* /      Patient  verified Yes     Visit #   Current / Total 23 28   Time   In / Out 244 337   Pain   In / Out 0 0   Subjective Functional Status/Changes: \"I don't feel much of anything. My back hurts\"    Changes to:  Allergies, Med Hx, Sx Hx?   no       TREATMENT AREA =  Pain in right shoulder [M25.511]    OBJECTIVE  Modalities Rationale:     decrease pain to improve patient's ability to progress to PLOF and address remaining functional goals.                             min [] Estim Unattended, type/location:                                                            []  w/ice    []  w/heat     min [] Estim Attended, type/location:                                                           []  w/US     []  w/ice    []  w/heat    []  TENS insruct       min []  Mechanical Traction: type/lbs                    []  pro   []  sup   []  int   []  cont    []  before manual    []  after manual     min []  Ultrasound, settings/location:        min []  Iontophoresis w/ dexamethasone, location:                                                []  take home patch       []  in clinic      5     min  unbilled []  Ice     [x]  Heat    location/position:  prior to treatment session - right shoulder      min []  Paraffin,  details:     10  min []  Vasopneumatic Device, press/temp:  34 deg, med      min []  Whirlpool / Fluido:     If using vaso (only need to measure limb vaso being performed on)      pre-treatment girth : 40 cm       post-treatment girth : 40 cm       measured at (landmark location) :  mid axilla      min []  Other:     Skin assessment post-treatment (if applicable):    []  intact    []  redness- no adverse reaction                 []redness - adverse reaction:       Therapeutic Procedures:  Tx Min

## 2024-03-01 ENCOUNTER — APPOINTMENT (OUTPATIENT)
Facility: HOSPITAL | Age: 79
End: 2024-03-01
Payer: MEDICARE

## 2024-03-06 ENCOUNTER — HOSPITAL ENCOUNTER (OUTPATIENT)
Facility: HOSPITAL | Age: 79
Setting detail: RECURRING SERIES
Discharge: HOME | End: 2024-03-09
Payer: MEDICARE

## 2024-03-06 PROCEDURE — 97530 THERAPEUTIC ACTIVITIES: CPT

## 2024-03-06 PROCEDURE — 97110 THERAPEUTIC EXERCISES: CPT

## 2024-03-06 PROCEDURE — 97112 NEUROMUSCULAR REEDUCATION: CPT

## 2024-03-06 PROCEDURE — 97016 VASOPNEUMATIC DEVICE THERAPY: CPT

## 2024-03-06 PROCEDURE — 97140 MANUAL THERAPY 1/> REGIONS: CPT

## 2024-03-06 NOTE — PROGRESS NOTES
extension 4+ 4+   Mid trap  NT NT   Lower trap NT NT    Status on2/23/2024:  - Not formally addressed but performed bilateral ER with Tband and Alt triceps, shoulder ext, and rows with OTB      Patient will improve pain in right shoulder to 4/10 at worst to improve overhead reaching tolerance and restore prior level of function.  Eval Status: 10/10 at worst   Last PN 2/16/24: see STG above  Current 3/6/24: worst pain within the last week = 5-6/10     PLAN  Yes  Continue plan of care  []  Upgrade activities as tolerated  []  Discharge due to :  []  Other:    Verónica Briones, PT    3/6/2024    11:03 AM    Future Appointments   Date Time Provider Department Center   3/6/2024 12:00 PM Verónica Briones, PT [facility]   3/8/2024 11:20 AM [facility]

## 2024-03-08 ENCOUNTER — HOSPITAL ENCOUNTER (OUTPATIENT)
Facility: HOSPITAL | Age: 79
Setting detail: RECURRING SERIES
Discharge: HOME | End: 2024-03-11
Payer: MEDICARE

## 2024-03-08 PROCEDURE — 97530 THERAPEUTIC ACTIVITIES: CPT

## 2024-03-08 PROCEDURE — 97110 THERAPEUTIC EXERCISES: CPT

## 2024-03-08 PROCEDURE — 97112 NEUROMUSCULAR REEDUCATION: CPT

## 2024-03-08 NOTE — PROGRESS NOTES
PHYSICAL / OCCUPATIONAL THERAPY - DAILY TREATMENT NOTE     Patient Name: Emiliana Kaur    Date: 3/8/2024    : 1945  Insurance: Payor: [employer] / Plan: DO [facility] / Product Type: *No Product type* /      Patient  verified Yes     Visit #   Current / Total 25 28   Time   In / Out 11:20am 12:08pm   Pain   In / Out 0 0   Subjective Functional Status/Changes: The pt requests D/C today    Changes to:  Allergies, Med Hx, Sx Hx?   no       TREATMENT AREA =  Pain in right shoulder [M25.511]    OBJECTIVE    Therapeutic Procedures:  Tx Min Billable or 1:1 Min (if diff from Tx Min) Procedure, Rationale, Specifics   20  [id number] Therapeutic Exercise (timed):  increase ROM, strength, coordination, balance, and proprioception to improve patient's ability to progress to PLOF and address remaining functional goals. (see flow sheet as applicable)    Details if applicable:       16  [id number] Neuromuscular Re-Education (timed):  improve balance, coordination, kinesthetic sense, posture, core stability and proprioception to improve patient's ability to develop conscious control of individual muscles and awareness of position of extremities in order to progress to PLOF and address remaining functional goals. (see flow sheet as applicable)    Details if applicable:     12  [id number] Therapeutic Activity (timed):  use of dynamic activities replicating functional movements to increase ROM, strength, coordination, balance, and proprioception in order to improve patient's ability to progress to PLOF and address remaining functional goals.  (see flow sheet as applicable)     Details if applicable:           Details if applicable:            Details if applicable:     48  [facility] Totals Reminder: bill using total billable min of TIMED therapeutic procedures (example: do not include dry needle or estim unattended, both untimed codes, in totals to left)  8-22 min = 1 unit; 23-37 min = 2 units; 38-52 min = 3 units; 53-67 min = 4 
Physical Therapy Discharge Instructions    [facility] [place] [address], [place] [address]  Phone: [phone]      Fax:  [phone]      Patient: Emiliana Kaur  : 1945      Continue Home Exercise Program 1-2x a day, 3-6x a week      Continue with    [x] Ice  as needed      [x] Heat           Follow up with MD:     [] Upon completion of therapy     [x] As needed      Recommendations:     [x]   Return to activity with home program    []   Return to activity with the following modifications:       []Post Rehab Program    []Join Independent aquatic program     []Return to/join local gym        Additional Comments: n/a          Verónica Briones, PT 3/8/2024 1:27 PM  
abdicated      []Due to lack of appreciable progress towards set goals    Verónica Briones, PT 3/8/2024 1:28 PM